# Patient Record
Sex: FEMALE | Race: WHITE | URBAN - METROPOLITAN AREA
[De-identification: names, ages, dates, MRNs, and addresses within clinical notes are randomized per-mention and may not be internally consistent; named-entity substitution may affect disease eponyms.]

---

## 2017-12-01 ENCOUNTER — HISTORICAL (OUTPATIENT)
Dept: LAB | Facility: HOSPITAL | Age: 62
End: 2017-12-01

## 2017-12-04 ENCOUNTER — HISTORICAL (OUTPATIENT)
Dept: SURGERY | Facility: HOSPITAL | Age: 62
End: 2017-12-04

## 2018-02-14 ENCOUNTER — HISTORICAL (OUTPATIENT)
Dept: LAB | Facility: HOSPITAL | Age: 63
End: 2018-02-14

## 2018-02-20 ENCOUNTER — HISTORICAL (OUTPATIENT)
Dept: SURGERY | Facility: HOSPITAL | Age: 63
End: 2018-02-20

## 2022-04-28 NOTE — OP NOTE
Patient:   Joya Heath            MRN: [id number]            FIN: [id number]               Age:   62 years     Sex:  Female     :  1955   Associated Diagnoses:   None   Author:   Diandra Rea MD      Operative Note   Operative Information   Date/ Time:  2018 13:17:00.     Procedures Performed: Colonoscopy with snare polypectomy.     Preoperative Diagnosis: History of colon polyps.     Postoperative Diagnosis: 1.  Ascending colon residual polyp.  Saline lift, snare polypectomy was done.  Base cauterized.  2.  Left colon polyps, ×2.  Snare polypectomy, Endo clipping done ×1.  3.  Large rectosigmoid polyp with broad-based stalk.  No snare polypectomy was attempted  4.  Small polyps, left colon.  Not removed.  5.  Internal hemorrhoids, grade 1 to 2.   .     Surgeon: Diandra Rea MD.     Assistant: Leandro MARSHALL, Pilo PEREZ, BRADY Hall     Anesthesia: per anaesthesia service.     Speciman Removed: Yes.     Description of Procedure/Findings/    Complications: History and physical as per pre-operative note. Informed consent was obtained. Patient was placed in left lateral position. Sedation per anaesthesia service. Rectal exam was unremarkable. Olympus video colonoscope was introduced into the rectum and was carefully advanced to the cecum. The quality of the preparation was satisfactory. Patient tolerated the procedure well without any complications..     Findings: Cecum unremarkable.  There was a significant residual polypoid tissue noted in the ascending colon.  Hot snare polypectomy was done in a piecemeal manner.  Significant portion of this polyp was removed.  The base was cauterized.  There was some bleeding noted but cauterized well after polypectomy and ablation of the base with the tip of snare loop.  Saline lift was done before polypectomy.  Area was marked with spot.  There was a medium-sized polypoid lesion noted in the distal transverse colon.  Hot snare polypectomy was done.  This was  approximately 8-9 mm.  There was some bleeding noted from the base.  Endo Clip was placed with good hemostasis.  There was a large pedunculated polyp approximately 1.5 cm or more noted in the left colon 30-35 cm from the anal verge.  The polypectomy was done with good hemostasis.  There could be minimal residual tissue at the tip of the stalk although there seemed to be good cautery effect.  There were small hyperplastic-appearing polyps noted in the left colon especially in sigmoid region.  These were not removed.  There was a quite large multilobulated polyp noted approximately 13-14 cm from the anal verge.  The polyp seemed to have a broad-based stalk.  No attempt was made to remove this polyp as patient seemed to have some coughing and retching during prolonged procedure.  Also it seems like it would be somewhat difficult for endoscopic removal.  Estimated withdrawal time from cecum to rectum was 1 hour 22 minutes and 50 seconds.     Complications: None.     Recommendations:  1.  Avoid NSAIDs for 2 weeks.  2.  Follow with me as scheduled.  3.  Consider colorectal surgery evaluation..

## 2022-04-28 NOTE — H&P
HISTORY OF PRESENT ILLNESS:  This is a 62 year old female with a past medical history of colon polyps diagnosed recently who was scheduled for colonoscopy today.  The patient had her last colonoscopy done  in December 2017.  She had a large hepatic flexure polyp which was partly removed with endo clipping done times two.  There was some residual polyp left.  There was some technical issue with the cautery preventing removal of the entire lesion.  There was also a large pedunculated left colon polyp and multilobulated large rectosigmoid polyp as well. Biopsies were done.  No removal was attempted because of technical issues with the cautery.  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  There was some suggestion of involvement of the polyp stump.  Rectoigmoid polyp was consistent with tubular adenoma.  The patient denies any significant GI issues at this time except for occasional constipation.    ALLERGIES:  CODEINE.    MEDICATIONS:  None.    PAST MEDICAL HISTORY/PAST SURGICAL HISTORY:  As above.    REVIEW OF SYSTEMS:  Appetite okay.  No weight loss.  No significant cardiopulmonary symptoms.  No significant GI issues except for occasional constipation.    PHYSICAL EXAMINATION:  VITAL SIGNS:  Stable and afebrile.   HEENT:  Unremarkable.   CHEST:  Clear to auscultation.   HEART:  S1, S2 audible, regular.   ABDOMEN:  Bowel sounds positive.  Soft.  Nontender.   EXTREMITIES:  No edema.    LABORATORY DATA:  BMP was unremarkable.  PT INR unremarkable.  CBC unremarkable.      ASSESSMENT/PLAN:  This is a 62 year old female with a past medical history as above scheduled for colonoscopy for removal of the remainder of the polyp.  The procedure has been discussed with the patient in detail.  She agrees and would like to proceed.        ______________________________  MD RYAN Angeles/SATURNINO  DD:  02/20/2018  Time:  11:35AM  DT:  02/20/2018   Time:  12:18PM  Job #:  [id number]    The H&P was reviewed, the patient was examined, and the following changes to the patients condition are noted:  ______________________________________________________________________________  ______________________________________________________________________________  ______________________________________________________________________________  [  ] No changes to the patient's condition:      ______________________________                                             ___________________  PHYSICIAN SIGNATURE                                                             DATE/TIME